# Patient Record
Sex: FEMALE | Race: WHITE | URBAN - METROPOLITAN AREA
[De-identification: names, ages, dates, MRNs, and addresses within clinical notes are randomized per-mention and may not be internally consistent; named-entity substitution may affect disease eponyms.]

---

## 2023-03-31 ENCOUNTER — APPOINTMENT (OUTPATIENT)
Dept: URBAN - METROPOLITAN AREA CLINIC 208 | Age: 48
Setting detail: DERMATOLOGY
End: 2023-04-03

## 2023-03-31 DIAGNOSIS — L20.84 INTRINSIC (ALLERGIC) ECZEMA: ICD-10-CM

## 2023-03-31 PROCEDURE — OTHER COUNSELING: TOPICAL STEROIDS: OTHER

## 2023-03-31 PROCEDURE — OTHER COUNSELING: OTHER

## 2023-03-31 PROCEDURE — OTHER PATIENT SPECIFIC COUNSELING: OTHER

## 2023-03-31 PROCEDURE — OTHER PRESCRIPTION: OTHER

## 2023-03-31 PROCEDURE — 99204 OFFICE O/P NEW MOD 45 MIN: CPT

## 2023-03-31 RX ORDER — BETAMETHASONE DIPROPIONATE 0.5 MG/G
OINTMENT TOPICAL
Qty: 90 | Refills: 1 | Status: ERX | COMMUNITY
Start: 2023-03-31

## 2023-03-31 NOTE — PROCEDURE: COUNSELING
Bleach Bath Recommendations: Products containing hypochlorous acid can help reduce bacterial colonization of skin.\\nHere are examples of some products that contain hypochlorous acid:\\n\\nSkinSmart Eczema Therapy Spray:\\nhttps://www.amazon.com/SkinSmart-ANTIMICROBIAL-oz-Clear-Hypochlorous-ingredients/dp/K75O6N1YE7\\n\\nVetricyn - it's the same product, marketed to pets, so it's in a larger size and cheaper:\\nhttps://www.amazon.com/Vetericyn-Plus-Animal-Wound-Spray/dp/Q00866NYCO\\n\\nWash/shampoo:\\nCLN body wash or CLN shampoo\\n\\n*****************************************************\\n\\nBLEACH BATH INSTRUCTIONS:\\n\\n1. Use regular strength ? 6 percent ? bleach for the bath. Do not use concentrated bleach.\\n2. Use a measuring cup or measuring spoon to add the bleach to the bath. Adding too much bleach to the bath can irritate your children?s skin. Adding too little bleach may not help.\\n3. Measure the amount of bleach before adding it to the bath water. For a full bathtub of water, use 1/2 cup of bleach. For a half-full tub of water, add 1/4 cup of bleach. For a baby or toddler bathtub, add one teaspoon of bleach per gallon of water.\\n4. Never apply bleach directly to your child?s eczema. While the tub is filling, pour the bleach into the water. Be sure to wait until the bath is fully drawn and bleach is poured before your child enters the tub.\\n5. Soak for 5-10 minutes, then rinse well (without soap) to avoid bleaching bath mats, carpets\\n6. Pat skin dry after the bath. If you use eczema medication, apply it immediately after the bath. Then moisturize your skin. Bleach Bath Recommendations: Products containing hypochlorous acid can help reduce bacterial colonization of skin.\\nHere are examples of some products that contain hypochlorous acid:\\n\\nSkinSmart Eczema Therapy Spray:\\nhttps://www.amazon.com/SkinSmart-ANTIMICROBIAL-oz-Clear-Hypochlorous-ingredients/dp/X78Z0S0GN4\\n\\nVetricyn - it's the same product, marketed to pets, so it's in a larger size and cheaper:\\nhttps://www.amazon.com/Vetericyn-Plus-Animal-Wound-Spray/dp/J86137DCZN\\n\\nWash/shampoo:\\nCLN body wash or CLN shampoo\\n\\n*****************************************************\\n\\nBLEACH BATH INSTRUCTIONS:\\n\\n1. Use regular strength ? 6 percent ? bleach for the bath. Do not use concentrated bleach.\\n2. Use a measuring cup or measuring spoon to add the bleach to the bath. Adding too much bleach to the bath can irritate your children?s skin. Adding too little bleach may not help.\\n3. Measure the amount of bleach before adding it to the bath water. For a full bathtub of water, use 1/2 cup of bleach. For a half-full tub of water, add 1/4 cup of bleach. For a baby or toddler bathtub, add one teaspoon of bleach per gallon of water.\\n4. Never apply bleach directly to your child?s eczema. While the tub is filling, pour the bleach into the water. Be sure to wait until the bath is fully drawn and bleach is poured before your child enters the tub.\\n5. Soak for 5-10 minutes, then rinse well (without soap) to avoid bleaching bath mats, carpets\\n6. Pat skin dry after the bath. If you use eczema medication, apply it immediately after the bath. Then moisturize your skin.

## 2023-03-31 NOTE — HPI: RASH
How Severe Is Your Rash?: moderate
Is This A New Presentation, Or A Follow-Up?: Rash
Additional History: Patient has had this rash before and it occurs in different places , went to urgent care last time it happened and given steroid ointment and pills and it helped.  Patient has photos also\\nPatient is autoimmune (Graves’ disease)

## 2023-05-12 ENCOUNTER — RX ONLY (RX ONLY)
Age: 48
End: 2023-05-12

## 2023-05-12 RX ORDER — DESOXIMETASONE 2.5 MG/G
OINTMENT TOPICAL
Qty: 60 | Refills: 1 | Status: ERX | COMMUNITY
Start: 2023-05-12

## 2024-08-16 ENCOUNTER — APPOINTMENT (OUTPATIENT)
Dept: URBAN - METROPOLITAN AREA CLINIC 208 | Age: 49
Setting detail: DERMATOLOGY
End: 2024-08-22

## 2024-08-16 DIAGNOSIS — L82.0 INFLAMED SEBORRHEIC KERATOSIS: ICD-10-CM

## 2024-08-16 DIAGNOSIS — L20.89 OTHER ATOPIC DERMATITIS: ICD-10-CM

## 2024-08-16 PROCEDURE — OTHER COUNSELING: OTHER

## 2024-08-16 PROCEDURE — OTHER RECOMMENDATIONS: OTHER

## 2024-08-16 PROCEDURE — 17110 DESTRUCT B9 LESION 1-14: CPT

## 2024-08-16 PROCEDURE — OTHER PRESCRIPTION MEDICATION MANAGEMENT: OTHER

## 2024-08-16 PROCEDURE — OTHER PRESCRIPTION: OTHER

## 2024-08-16 PROCEDURE — OTHER MIPS QUALITY: OTHER

## 2024-08-16 PROCEDURE — 99214 OFFICE O/P EST MOD 30 MIN: CPT | Mod: 25

## 2024-08-16 PROCEDURE — OTHER LIQUID NITROGEN: OTHER

## 2024-08-16 RX ORDER — TRIAMCINOLONE ACETONIDE 1 MG/G
CREAM TOPICAL
Qty: 80 | Refills: 3 | Status: ERX | COMMUNITY
Start: 2024-08-16

## 2024-08-16 RX ORDER — PIMECROLIMUS 10 MG/G
CREAM TOPICAL
Qty: 30 | Refills: 1 | Status: ERX | COMMUNITY
Start: 2024-08-16

## 2024-08-16 ASSESSMENT — LOCATION SIMPLE DESCRIPTION DERM
LOCATION SIMPLE: LEFT FOREARM
LOCATION SIMPLE: RIGHT CHEEK
LOCATION SIMPLE: LEFT CHEEK

## 2024-08-16 ASSESSMENT — LOCATION DETAILED DESCRIPTION DERM
LOCATION DETAILED: LEFT CENTRAL MALAR CHEEK
LOCATION DETAILED: LEFT SUPERIOR MEDIAL MALAR CHEEK
LOCATION DETAILED: LEFT VENTRAL DISTAL FOREARM
LOCATION DETAILED: RIGHT SUPERIOR MEDIAL MALAR CHEEK

## 2024-08-16 ASSESSMENT — LOCATION ZONE DERM
LOCATION ZONE: FACE
LOCATION ZONE: ARM

## 2024-08-16 NOTE — HPI: MEDICATION
What Is The Medication?: Triamcinolone 0.1% cream
What Condition Does This Medication Treat?: Eczema

## 2024-08-16 NOTE — PROCEDURE: PRESCRIPTION MEDICATION MANAGEMENT
Render In Strict Bullet Format?: No
Detail Level: Simple
Initiate Treatment: triamcinolone acetonide 0.1 % topical cream: Apply thin layer to affected areas of Eczema twice a day for 2 weeks, stop for 2 weeks, then repeat as needed for flares. Avoid applying to face, armpits and groin.\\n\\npimecrolimus 1 % topical cream: Apply to all affected areas of eczema twice daily until clear. Safe to use long term.

## 2024-08-16 NOTE — PROCEDURE: LIQUID NITROGEN
Application Tool (Optional): Liquid Nitrogen Sprayer
Detail Level: Detailed
Show Aperture Variable?: Yes
Duration Of Freeze Thaw-Cycle (Seconds): 5
Post-Care Instructions: I reviewed with the patient in detail post-care instructions. Patient is to wear sunprotection, and avoid picking at any of the treated lesions. Pt may apply Vaseline to crusted or scabbing areas.
Consent: The patient's consent was obtained including but not limited to risks of crusting, scabbing, blistering, scarring, darker or lighter pigmentary change, recurrence, incomplete removal and infection.
Medical Necessity Clause: This procedure was medically necessary because the lesions that were treated were:
Spray Paint Technique: No
Spray Paint Text: The liquid nitrogen was applied to the skin utilizing a spray paint frosting technique.
Number Of Freeze-Thaw Cycles: 3 freeze-thaw cycles
Medical Necessity Information: It is in your best interest to select a reason for this procedure from the list below. All of these items fulfill various CMS LCD requirements except the new and changing color options.